# Patient Record
Sex: FEMALE | Race: WHITE | Employment: FULL TIME | URBAN - METROPOLITAN AREA
[De-identification: names, ages, dates, MRNs, and addresses within clinical notes are randomized per-mention and may not be internally consistent; named-entity substitution may affect disease eponyms.]

---
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
## 2019-12-10 ENCOUNTER — HOSPITAL ENCOUNTER (OUTPATIENT)
Age: 19
Setting detail: SPECIMEN
Discharge: HOME | End: 2019-12-10
Payer: COMMERCIAL

## 2019-12-10 ENCOUNTER — OFFICE VISIT (OUTPATIENT)
Dept: OBGYN CLINIC | Age: 19
End: 2019-12-10
Payer: COMMERCIAL

## 2019-12-10 VITALS
HEIGHT: 70 IN | WEIGHT: 147 LBS | BODY MASS INDEX: 21.05 KG/M2 | DIASTOLIC BLOOD PRESSURE: 80 MMHG | SYSTOLIC BLOOD PRESSURE: 112 MMHG

## 2019-12-10 DIAGNOSIS — Z01.419 ENCOUNTER FOR GYNECOLOGICAL EXAMINATION: Primary | ICD-10-CM

## 2019-12-10 PROCEDURE — 99385 PREV VISIT NEW AGE 18-39: CPT | Performed by: NURSE PRACTITIONER

## 2019-12-10 PROCEDURE — G8484 FLU IMMUNIZE NO ADMIN: HCPCS | Performed by: NURSE PRACTITIONER

## 2019-12-10 ASSESSMENT — ENCOUNTER SYMPTOMS
SHORTNESS OF BREATH: 0
ABDOMINAL DISTENTION: 0
BACK PAIN: 0
DIARRHEA: 0
ABDOMINAL PAIN: 0
CONSTIPATION: 0
COUGH: 0

## 2019-12-12 LAB — CYTOLOGY REPORT: NORMAL

## 2020-02-18 ENCOUNTER — TELEPHONE (OUTPATIENT)
Dept: OBGYN CLINIC | Age: 20
End: 2020-02-18

## 2020-02-18 NOTE — TELEPHONE ENCOUNTER
Patient noticed a lump under her left arm Sunday feb 9th over the past week though it seems to be getting smaller. States it seemed smaller than a milvia, she believes it might just be a lymph nod but wanted to schedule an appointment just to be sure. She has an appointment for 3-16-20. Will call if anything drastic changes.

## 2020-03-16 NOTE — PROGRESS NOTES
18 Y/O Pt calling back, received message that her appt had been cancelled and imaging has been ordered. Noticed lump in left breast on 02/09/20, no pain and no nipple discharge. Gave pt number to call and scheduled central scheduling for left breast US. Pt informed the office that her PGM and M. Aunt both had Br Ca. Pt states her other P. Aunts and P.Cousins all screened negative for [facility] gene. She is uncertain if the P. Aunt that had the Br Ca what her BRCA results where. Advised pt to call back when she finds out.

## 2020-03-18 ENCOUNTER — HOSPITAL ENCOUNTER (OUTPATIENT)
Dept: ULTRASOUND IMAGING | Age: 20
Discharge: HOME | End: 2020-03-20
Payer: COMMERCIAL

## 2020-03-18 PROCEDURE — 76642 ULTRASOUND BREAST LIMITED: CPT

## 2020-03-18 PROCEDURE — 19083 BX BREAST 1ST LESION US IMAG: CPT

## 2020-03-18 PROCEDURE — 2500000003 HC RX 250 WO HCPCS

## 2020-03-18 PROCEDURE — 88305 TISSUE EXAM BY PATHOLOGIST: CPT

## 2020-03-19 ENCOUNTER — TELEPHONE (OUTPATIENT)
Dept: OBGYN CLINIC | Age: 20
End: 2020-03-19

## 2020-03-19 LAB — SURGICAL PATHOLOGY REPORT: NORMAL

## 2020-03-19 NOTE — TELEPHONE ENCOUNTER
US results:  03/18/20 Left breast: Solid 1.4 cm mass at the patient indicated palpable lump in the left axilla. Ultrasound-guided biopsy is recommended.     [facility]     (orders have been placed for breat bx, plz call to )

## 2020-09-29 ENCOUNTER — TELEPHONE (OUTPATIENT)
Dept: OBGYN CLINIC | Age: 20
End: 2020-09-29

## 2020-09-29 PROBLEM — N63.0 BREAST LUMP IN FEMALE: Status: ACTIVE | Noted: 2020-02-09

## 2020-09-29 NOTE — TELEPHONE ENCOUNTER
22 y/o Gyn Pt [facility]. Just recently found out that her father is BRCA-1 gene carrier.

## 2020-10-26 ENCOUNTER — HOSPITAL ENCOUNTER (OUTPATIENT)
Dept: ULTRASOUND IMAGING | Age: 20
Discharge: HOME | End: 2020-10-28
Payer: COMMERCIAL

## 2020-10-26 PROCEDURE — 76642 ULTRASOUND BREAST LIMITED: CPT

## 2020-12-11 ENCOUNTER — OFFICE VISIT (OUTPATIENT)
Dept: OBGYN CLINIC | Age: 20
End: 2020-12-11
Payer: COMMERCIAL

## 2020-12-11 ENCOUNTER — HOSPITAL ENCOUNTER (OUTPATIENT)
Age: 20
Setting detail: SPECIMEN
Discharge: HOME | End: 2020-12-11
Payer: COMMERCIAL

## 2020-12-11 VITALS
BODY MASS INDEX: 21.7 KG/M2 | TEMPERATURE: 98.4 F | WEIGHT: 151.6 LBS | SYSTOLIC BLOOD PRESSURE: 102 MMHG | DIASTOLIC BLOOD PRESSURE: 72 MMHG | HEIGHT: 70 IN

## 2020-12-11 PROBLEM — F90.0 ATTENTION DEFICIT HYPERACTIVITY DISORDER, PREDOMINANTLY INATTENTIVE TYPE: Status: ACTIVE | Noted: 2020-12-11

## 2020-12-11 PROCEDURE — G8484 FLU IMMUNIZE NO ADMIN: HCPCS | Performed by: NURSE PRACTITIONER

## 2020-12-11 PROCEDURE — 99395 PREV VISIT EST AGE 18-39: CPT | Performed by: NURSE PRACTITIONER

## 2020-12-11 RX ORDER — NORGESTREL AND ETHINYL ESTRADIOL 0.3-0.03MG
1 KIT ORAL DAILY
Qty: 1 PACKET | Refills: 12 | Status: SHIPPED | OUTPATIENT
Start: 2020-12-11 | End: 2021-02-23

## 2020-12-11 ASSESSMENT — ENCOUNTER SYMPTOMS
CONSTIPATION: 0
ABDOMINAL DISTENTION: 0
ABDOMINAL PAIN: 0
DIARRHEA: 0
SHORTNESS OF BREATH: 0
COUGH: 0
BACK PAIN: 0

## 2020-12-11 NOTE — PROGRESS NOTES
HPI:     Arlyn Hughes a 23 y.o. female who presents today for:  Chief Complaint   Patient presents with    Annual Exam     last pap 12/10/19-WNL        HPI  Here for annual exam. 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 . Currently working at [employer]. No children. Her dad had BRCA testing done (his mom and sister had BrCa) and was positive. Her sister tested negative. She is having her testing done on 12/21. Eating healthy and exercises daily. GYNECOLOGICHISTORY:  MenstrualHistory: Patient's last menstrual period was 12/08/2020. Sexually Transmitted Infections: No  History of Ectopic Pregnancy:No  Menarche:12  Cycles q 28 Days  Durationof cycles: 3  Dysmenorrhea: minimal  Sexually active: yes  Dyspareunia: none    Current Outpatient Medications   Medication Sig Dispense Refill    norgestrel-ethinyl estradiol (ELINEST) 0.3-30 MG-MCG per tablet Take 1 tablet by mouth daily 1 packet 12     No current facility-administered medications for this visit. No Known Allergies    Past Medical History:   Diagnosis Date    Breast lump in female 02/09/2020    Lt Axilla    FH: BRCA1 gene positive 09/2020    father carrier     Denies family history of breast, ovarian, uterus, colon CA     Past Surgical History:   Procedure Laterality Date    BREAST BIOPSY Left 03/19/2020    Left Axilla biopsy:benign    WISDOM TOOTH EXTRACTION       Family History   Problem Relation Age of Onset    No Known Problems Mother     Other Father         BRCA-1 gene positive    No Known Problems Maternal Grandmother     No Known Problems Maternal Grandfather     Breast Cancer Paternal Grandmother 36    Lung Cancer Paternal Grandmother     No Known Problems Paternal Grandfather     Breast Cancer Paternal Aunt 30        three episodes Br Ca     Social History     Tobacco Use    Smoking status: Never Smoker    Smokeless tobacco: Never Used   Substance Use Topics    Alcohol use:  Yes Subjective:      Review of Systems   Constitutional: Negative for appetite change and fatigue. HENT: Negative for congestion and hearing loss. Eyes: Negative for visual disturbance. Respiratory: Negative for cough and shortness of breath. Cardiovascular: Negative for chest pain and palpitations. Gastrointestinal: Negative for abdominal distention, abdominal pain, constipation and diarrhea. Genitourinary: Negative for flank pain, frequency, menstrual problem, pelvic pain and vaginal discharge. Musculoskeletal: Negative for back pain. Neurological: Negative for syncope and headaches. Psychiatric/Behavioral: Negative for behavioral problems. Objective:     Temp 98.4 °F (36.9 °C)   Ht 5' 9.75\" (1.772 m)   Wt 151 lb 9.6 oz (68.8 kg)   LMP 12/08/2020   Breastfeeding No   BMI 21.91 kg/m²   Physical Exam  Constitutional:       Appearance: She is well-developed. HENT:      Head: Normocephalic. Eyes:      Extraocular Movements: Extraocular movements intact. Conjunctiva/sclera: Conjunctivae normal.   Neck:      Musculoskeletal: Normal range of motion. Thyroid: No thyromegaly. Trachea: No tracheal deviation. Pulmonary:      Effort: Pulmonary effort is normal. No respiratory distress. Chest:      Breasts: Breasts are symmetrical.         Right: No inverted nipple. Left: No inverted nipple, mass, nipple discharge, skin change or tenderness. Abdominal:      General: There is no distension. Palpations: Abdomen is soft. There is no mass. Tenderness: There is no abdominal tenderness. Genitourinary:     Labia:         Right: No rash or lesion. Left: No rash or lesion. Vagina: No vaginal discharge or tenderness. Cervix: No cervical motion tenderness, discharge or friability. Uterus: Not deviated, not enlarged and not fixed. Adnexa:         Right: No mass, tenderness or fullness.           Left: No mass, tenderness or fullness. Musculoskeletal: Normal range of motion. General: No tenderness. Skin:     General: Skin is warm and dry. Neurological:      General: No focal deficit present. Mental Status: She is alert and oriented to person, place, and time. Mental status is at baseline. Psychiatric:         Mood and Affect: Mood normal.         Behavior: Behavior normal.         Thought Content: Thought content normal.         Judgment: Judgment normal.           Assessment:     1. Encounter for gynecological examination          Plan:   1. Pap collected. Discussed new pap smear guidelines. Desires re-pap in 1 year. 2. Breast self exam reviewed  3. Calcium and Vitamin D dosing reviewed. 4. Seat belt use reviewed  5. Family planning reviewed.   Birth control ocp  Gardasil status   FU US left breast early January  Electronicallysigned by Dylan Bland on 12/11/2020

## 2020-12-22 LAB — CYTOLOGY REPORT: NORMAL

## 2021-01-27 ENCOUNTER — HOSPITAL ENCOUNTER (OUTPATIENT)
Dept: ULTRASOUND IMAGING | Age: 21
Discharge: HOME | End: 2021-01-29
Payer: COMMERCIAL

## 2021-01-27 DIAGNOSIS — Z80.3 FH: BREAST CANCER: ICD-10-CM

## 2021-01-27 DIAGNOSIS — N63.20 MASS OF LEFT BREAST: ICD-10-CM

## 2021-01-27 DIAGNOSIS — Z98.890 HISTORY OF BENIGN BREAST BIOPSY: ICD-10-CM

## 2021-01-27 PROCEDURE — 76642 ULTRASOUND BREAST LIMITED: CPT

## 2021-02-10 DIAGNOSIS — N63.32 MASS OF AXILLARY TAIL OF LEFT BREAST: Primary | ICD-10-CM

## 2021-02-10 DIAGNOSIS — Z80.3 FH: BREAST CANCER: ICD-10-CM

## 2021-02-10 DIAGNOSIS — Z98.890 HISTORY OF BENIGN BREAST BIOPSY: ICD-10-CM

## 2021-05-18 RX ORDER — NORGESTREL AND ETHINYL ESTRADIOL 0.3-0.03MG
KIT ORAL
Qty: 84 TABLET | Refills: 3 | Status: SHIPPED | OUTPATIENT
Start: 2021-05-18 | End: 2021-05-24

## 2021-05-24 RX ORDER — NORGESTREL AND ETHINYL ESTRADIOL 0.3-0.03MG
KIT ORAL
Qty: 84 TABLET | Refills: 3 | Status: SHIPPED | OUTPATIENT
Start: 2021-05-24 | End: 2021-12-13 | Stop reason: SDUPTHER

## 2021-12-10 ASSESSMENT — ENCOUNTER SYMPTOMS
ABDOMINAL DISTENTION: 0
COUGH: 0
SHORTNESS OF BREATH: 0
BACK PAIN: 0
CONSTIPATION: 0
ABDOMINAL PAIN: 0
DIARRHEA: 0

## 2021-12-10 NOTE — PROGRESS NOTES
[facility]  [facility] [place] [address]  [address]  Dept: [phone]    DATE OF VISIT:  12/10/21        History and Physical    Trang Hargrove    :  2000  CHIEF COMPLAINT:  No chief complaint on file. HPI :   Trang Hargrove is a 19 y.o. female here for her annual exam.     Found out she is positive BRCA-1 positive this year. Her PGM  of OvCa almost 50 years ago. Paternal aunt dx OvCa within last year and she also has BrCa. Works as a .  No children. Eating healthy, staying very active. Menarche at 13. She is sexually active. She uses OCP for contraception and is not using condoms for STI protection. Periods are monthly, occurring every 28-30 days and lasting 3 days. She denies HMB and has minimal dysmenorrhea. The patient denies any family member or herself as having a DVT or blood clotting disorder, cardiac disease (including HTN), risk for CVA disease/stroke and no hx of migraine with aura. Non-smoker if 28 or older.   Aware of need to notify office with and changes in health that includes any of these dx.    _____________________________________________________________________  Past Medical History:   Diagnosis Date    Breast lump in female 2020    Lt Axilla    FH: BRCA1 gene positive 2020    father carrier                                                                   Past Surgical History:   Procedure Laterality Date    BREAST BIOPSY Left 2020    Left Axilla biopsy:benign    WISDOM TOOTH EXTRACTION       Family History   Problem Relation Age of Onset    No Known Problems Mother     Other Father         BRCA-1 gene positive    No Known Problems Maternal Grandmother     No Known Problems Maternal Grandfather     Breast Cancer Paternal Grandmother 36    Lung Cancer Paternal Grandmother     No Known Problems Paternal Grandfather     Breast Cancer Paternal Aunt 34        three episodes Br Ca     Social History     Tobacco Use   Smoking Status Never Smoker   Smokeless Tobacco Never Used     Social History     Substance and Sexual Activity   Alcohol Use Yes     Current Outpatient Medications   Medication Sig Dispense Refill    LOW-OGESTREL 0.3-30 MG-MCG per tablet TAKE AS DIRECTED BY PRESCRIBER OR PACKAGE INSTRUCTIONS (APPOINTMENT 20) 84 tablet 3     No current facility-administered medications for this visit. Allergies:  No Known Allergies    Gynecologic History:  No LMP recorded. Sexually Active: Yes  How many partners in the last 12 months- 1  Partners: monogamous BF  Dyspareunia: denies  STD History:No  Birth Control: Yes OCP  Pap History: 20  Gardasil: complete  Family hx Breast, Ovarian, Colorectal Cancer: Pt BRCA-1 positive       OB History    Para Term  AB Living   0 0 0 0 0 0   SAB IAB Ectopic Molar Multiple Live Births   0 0 0 0 0 0       Review of Systems   Constitutional: Negative for appetite change and fatigue. HENT: Negative for congestion and hearing loss. Eyes: Negative for visual disturbance. Respiratory: Negative for cough and shortness of breath. Cardiovascular: Negative for chest pain and palpitations. Gastrointestinal: Negative for abdominal distention, abdominal pain, constipation and diarrhea. Genitourinary: Negative for flank pain, frequency, menstrual problem, pelvic pain and vaginal discharge. Musculoskeletal: Negative for back pain. Neurological: Negative for syncope and headaches. Psychiatric/Behavioral: Negative for behavioral problems. There were no vitals taken for this visit. Physical Exam  Constitutional:       Appearance: She is well-developed. HENT:      Head: Normocephalic. Eyes:      Extraocular Movements: Extraocular movements intact. Conjunctiva/sclera: Conjunctivae normal.   Neck:      Thyroid: No thyromegaly. Trachea: No tracheal deviation.    Pulmonary:      Effort: Pulmonary effort is normal. No respiratory distress. Chest:   Breasts: Breasts are symmetrical.      Right: No inverted nipple. Left: No inverted nipple, mass, nipple discharge, skin change or tenderness. Abdominal:      General: There is no distension. Palpations: Abdomen is soft. There is no mass. Tenderness: There is no abdominal tenderness. Genitourinary:     Labia:         Right: No rash or lesion. Left: No rash or lesion. Vagina: No vaginal discharge or tenderness. Cervix: No cervical motion tenderness, discharge or friability. Uterus: Not deviated, not enlarged and not fixed. Adnexa:         Right: No mass, tenderness or fullness. Left: No mass, tenderness or fullness. Musculoskeletal:         General: No tenderness. Normal range of motion. Cervical back: Normal range of motion. Skin:     General: Skin is warm and dry. Neurological:      General: No focal deficit present. Mental Status: She is alert and oriented to person, place, and time. Mental status is at baseline. Psychiatric:         Mood and Affect: Mood normal.         Behavior: Behavior normal.         Thought Content: Thought content normal.         Judgment: Judgment normal.             ASSESSMENT:     19 y.o. Female; Annual   Diagnosis Orders   1. FH: breast cancer       No follow-ups on file. PLAN:  - Pap collected per ASCCP Guidelines. - Birth control discussed. - Smoking risk factors discussed  - Diet and exercise reviewed. - Routine health maintenance per patient's PCP.     Electronically signed by EROS Mcdonald CNP on 12/10/21 at 12:27 PM EST  [address] [facility]

## 2021-12-13 ENCOUNTER — OFFICE VISIT (OUTPATIENT)
Dept: OBGYN CLINIC | Age: 21
End: 2021-12-13
Payer: COMMERCIAL

## 2021-12-13 ENCOUNTER — HOSPITAL ENCOUNTER (OUTPATIENT)
Age: 21
Setting detail: SPECIMEN
Discharge: HOME | End: 2021-12-13

## 2021-12-13 VITALS
DIASTOLIC BLOOD PRESSURE: 84 MMHG | BODY MASS INDEX: 21.73 KG/M2 | WEIGHT: 151.8 LBS | SYSTOLIC BLOOD PRESSURE: 122 MMHG | HEIGHT: 70 IN

## 2021-12-13 DIAGNOSIS — Z15.02 BRCA1 GENE MUTATION POSITIVE IN FEMALE: Primary | ICD-10-CM

## 2021-12-13 DIAGNOSIS — Z15.09 BRCA1 GENE MUTATION POSITIVE IN FEMALE: Primary | ICD-10-CM

## 2021-12-13 DIAGNOSIS — Z30.41 ORAL CONTRACEPTIVE PILL SURVEILLANCE: ICD-10-CM

## 2021-12-13 DIAGNOSIS — Z01.419 ENCOUNTER FOR GYNECOLOGICAL EXAMINATION: ICD-10-CM

## 2021-12-13 DIAGNOSIS — Z80.3 FH: BREAST CANCER: ICD-10-CM

## 2021-12-13 DIAGNOSIS — Z15.01 BRCA1 GENE MUTATION POSITIVE IN FEMALE: Primary | ICD-10-CM

## 2021-12-13 PROCEDURE — G8484 FLU IMMUNIZE NO ADMIN: HCPCS | Performed by: NURSE PRACTITIONER

## 2021-12-13 PROCEDURE — 99395 PREV VISIT EST AGE 18-39: CPT | Performed by: NURSE PRACTITIONER

## 2021-12-13 RX ORDER — NORGESTREL AND ETHINYL ESTRADIOL 0.3-0.03MG
KIT ORAL
Qty: 84 TABLET | Refills: 4 | Status: SHIPPED | OUTPATIENT
Start: 2021-12-13 | End: 2022-08-04 | Stop reason: SDUPTHER

## 2021-12-13 RX ORDER — CLINDAMYCIN PHOSPHATE AND BENZOYL PEROXIDE 10; 50 MG/G; MG/G
GEL TOPICAL
COMMUNITY
Start: 2021-12-01

## 2021-12-20 LAB — CYTOLOGY REPORT: NORMAL

## 2021-12-22 ENCOUNTER — TELEPHONE (OUTPATIENT)
Dept: GYNECOLOGIC ONCOLOGY | Age: 21
End: 2021-12-22

## 2022-01-07 ENCOUNTER — OFFICE VISIT (OUTPATIENT)
Dept: GYNECOLOGIC ONCOLOGY | Age: 22
End: 2022-01-07
Payer: COMMERCIAL

## 2022-01-07 VITALS
TEMPERATURE: 98.1 F | BODY MASS INDEX: 21.99 KG/M2 | HEART RATE: 57 BPM | HEIGHT: 70 IN | SYSTOLIC BLOOD PRESSURE: 113 MMHG | DIASTOLIC BLOOD PRESSURE: 73 MMHG | OXYGEN SATURATION: 100 % | WEIGHT: 153.6 LBS

## 2022-01-07 DIAGNOSIS — Z15.09 BRCA GENE POSITIVE: Primary | ICD-10-CM

## 2022-01-07 DIAGNOSIS — Z15.01 BRCA GENE POSITIVE: Primary | ICD-10-CM

## 2022-01-07 PROCEDURE — G8420 CALC BMI NORM PARAMETERS: HCPCS | Performed by: OBSTETRICS & GYNECOLOGY

## 2022-01-07 PROCEDURE — 1036F TOBACCO NON-USER: CPT | Performed by: OBSTETRICS & GYNECOLOGY

## 2022-01-07 PROCEDURE — 99203 OFFICE O/P NEW LOW 30 MIN: CPT | Performed by: OBSTETRICS & GYNECOLOGY

## 2022-01-07 PROCEDURE — G8427 DOCREV CUR MEDS BY ELIG CLIN: HCPCS | Performed by: OBSTETRICS & GYNECOLOGY

## 2022-01-07 PROCEDURE — G8484 FLU IMMUNIZE NO ADMIN: HCPCS | Performed by: OBSTETRICS & GYNECOLOGY

## 2022-01-07 ASSESSMENT — ENCOUNTER SYMPTOMS
ABDOMINAL PAIN: 1
EYES NEGATIVE: 1
RESPIRATORY NEGATIVE: 1

## 2022-01-07 NOTE — PROGRESS NOTES
Review of Systems   Constitutional: Negative. HENT:  Negative. Eyes: Negative. Respiratory: Negative. Cardiovascular: Negative. Gastrointestinal: Positive for abdominal pain. Endocrine: Negative. Genitourinary: Negative. Musculoskeletal: Negative. Skin: Negative. Neurological: Negative. Hematological: Negative.

## 2022-01-07 NOTE — PROGRESS NOTES
[address] [facility], [facility] [address]  [place] [address]      I am seeing Jeovany Aura for New Patient at the request of IDA Taveras. Her PCP is Silvino Beach. Chief Complaint: BRCA1 positivity    HPI  She is a 19 y.o. [de-identified] female who is being referred for discovered BRCA1 positivity. The patient states that her paternal grandmother  of ovarian cancer about 50 years ago. Her paternal aunt was diagnosed with ovarian cancer this last year and also had breast cancer. Her father therefore was tested and was found to be BRCA1 positive. She and her sister saw Amita Sandoval, genetic counselor at the [address] [place] [address] Pkwy was recommended. Her sister was negative however Christiana Hagen was BRCA1 positive. This was discovered in 2020. The patient then saw Dominga Alves for a well woman examination. Ultrasound of the breast was performed on 3/18/2020. She was noted to have a 1.4 cm solid mass in the tail of the left breast.  Ultrasound-guided biopsy was recommended and was performed and was \"benign\". The patient had a follow-up breast ultrasound on 10/26/2020 that revealed the same lesion that was \"minimally larger\". A short interval ultrasound follow-up was recommended in 3 to 4 months. This was performed on 2021. The \"solid hypoechoic ovoid well-demarcated mass measured 1.36 x 1.5 x 0.  8 cm in was \"minimally larger\" but \"similar in appearance\" and \"no significant interval change\". BI-RADS Category 2    An additional follow-up ultrasound was recommended in 12 months. Months. IDA Taveras has also referred her to [facility] gynecologic oncology for further counseling. The patient states that she began menarche at age 13. She is on birth control pills for contraception. Her menses are regular every 28 to 30 days and last about 3 days. She has no spotting in between menses. She denies dysmenorrhea.     Patient has not had pelvic ultrasound performed. Patient she has not had  antigen. She has not seen a breast specialist as yet. Review of Systems  I have personally reviewed and agree with the review of systems done by my ancillary staff in the [facility] documentation. OBJECTIVE:  Vitals:    01/07/22 0858   BP: 113/73   Site: Right Upper Arm   Position: Sitting   Cuff Size: Medium Adult   Pulse: 57   Temp: 98.1 °F (36.7 °C)   SpO2: 100%   Weight: 153 lb 9.6 oz (69.7 kg)   Height: 5' 10\" (1.778 m)       General: Alert and oriented x3, no acute distress    Further examination was not performed but rather a discussion was held regarding her situation and management options. Family History   Problem Relation Age of Onset    No Known Problems Mother     Other Father         BRCA-1 gene positive    No Known Problems Maternal Grandmother     No Known Problems Maternal Grandfather     Breast Cancer Paternal Grandmother 36    Lung Cancer Paternal Grandmother     No Known Problems Paternal Grandfather     Breast Cancer Paternal Aunt 30        three episodes Br Ca       Assessment:  1. BRCA gene positive        Discussion: The discussion was held with the patient concerning management options. The patient was given [facility] high risk breast program number at [phone] and was advised to call regarding additional information for management. I mention that the guidelines state that usually at age 22 clinical breast examinations every 6 months are performed along with an annual breast MRI. I mention that it age 25 and annual mammogram is performed and alternated with breast MRI every 6 months. At age 26 the patient may consider having breast reduction surgery. As far as ovarian cancer prevention. I mention that the birth control pill is a reduction measure. Ordinarily by age 20 I would obtain a baseline pelvic and transvaginal ultrasound to evaluate the ovaries and a Ca1 25.     Again after age 28 or when her family is complete she should consider prophylactic bilateral salpingo-oophorectomy to reduce her chances of ovarian and/or fallopian tube cancer . All of her questions were answered to her satisfaction and she is agreed to proceed as planned. Plan:  1. Obtain baseline mammogram    2. Obtain baseline breast MRI in 6 months prior to her next visit    3. At her next visit we may wish to consider referral to a breast specialist for future monitoring, etc..    4.  [facility] consultation now. 5.  We will obtain pelvic and transvaginal ultrasound now and again before next visit    6. We will obtain Ca1 25 antigen now and again before next visit. 7.  We will have her return in 6 months for follow-up and to review results. Follow Up: 6 months            I spent 30 minutes providing care to the patient and >50% of the time was spent counseling and conoordinating care, discussing the patient's current situation, reviewing her options, counseling and education her and answering her questions. All of the patient's pertinent images, labs and previous records and procedures were reviewed.     Electronically signed by Jem Dominguez MD on 1/7/22 at 9:27 AM EST

## 2022-01-28 ENCOUNTER — HOSPITAL ENCOUNTER (OUTPATIENT)
Dept: ULTRASOUND IMAGING | Age: 22
Discharge: HOME | End: 2022-01-30
Payer: COMMERCIAL

## 2022-01-28 DIAGNOSIS — Z15.01 BRCA GENE POSITIVE: ICD-10-CM

## 2022-01-28 DIAGNOSIS — Z15.09 BRCA GENE POSITIVE: ICD-10-CM

## 2022-01-28 PROCEDURE — 76856 US EXAM PELVIC COMPLETE: CPT

## 2022-01-28 PROCEDURE — 76830 TRANSVAGINAL US NON-OB: CPT

## 2022-03-01 DIAGNOSIS — Z12.31 VISIT FOR SCREENING MAMMOGRAM: Primary | ICD-10-CM

## 2022-04-04 ENCOUNTER — HOSPITAL ENCOUNTER (OUTPATIENT)
Age: 22
Discharge: HOME | End: 2022-04-04
Payer: COMMERCIAL

## 2022-04-04 ENCOUNTER — APPOINTMENT (OUTPATIENT)
Dept: MRI IMAGING | Age: 22
End: 2022-04-04
Payer: COMMERCIAL

## 2022-04-04 ENCOUNTER — HOSPITAL ENCOUNTER (OUTPATIENT)
Dept: MAMMOGRAPHY | Age: 22
Discharge: HOME | End: 2022-04-06
Payer: COMMERCIAL

## 2022-04-04 DIAGNOSIS — Z15.09 BRCA GENE POSITIVE: ICD-10-CM

## 2022-04-04 DIAGNOSIS — Z12.31 VISIT FOR SCREENING MAMMOGRAM: ICD-10-CM

## 2022-04-04 DIAGNOSIS — Z15.01 BRCA GENE POSITIVE: ICD-10-CM

## 2022-04-04 LAB — CA 125: 19 U/ML

## 2022-04-04 PROCEDURE — 86304 IMMUNOASSAY TUMOR CA 125: CPT

## 2022-04-04 PROCEDURE — 77063 BREAST TOMOSYNTHESIS BI: CPT

## 2022-04-04 PROCEDURE — 36415 COLL VENOUS BLD VENIPUNCTURE: CPT

## 2022-04-04 RX ORDER — NORGESTREL AND ETHINYL ESTRADIOL 0.3-0.03MG
KIT ORAL
Qty: 84 TABLET | Refills: 3 | OUTPATIENT
Start: 2022-04-04

## 2022-04-05 DIAGNOSIS — Z15.01 BRCA GENE POSITIVE: Primary | ICD-10-CM

## 2022-04-05 DIAGNOSIS — Z15.09 BRCA GENE POSITIVE: Primary | ICD-10-CM

## 2022-06-30 ENCOUNTER — HOSPITAL ENCOUNTER (OUTPATIENT)
Dept: ULTRASOUND IMAGING | Age: 22
Discharge: HOME | End: 2022-07-02
Payer: COMMERCIAL

## 2022-06-30 ENCOUNTER — HOSPITAL ENCOUNTER (OUTPATIENT)
Age: 22
Discharge: HOME | End: 2022-06-30
Payer: COMMERCIAL

## 2022-06-30 DIAGNOSIS — Z15.09 BRCA GENE POSITIVE: ICD-10-CM

## 2022-06-30 DIAGNOSIS — Z15.01 BRCA GENE POSITIVE: ICD-10-CM

## 2022-06-30 LAB — CA 125: 20 U/ML

## 2022-06-30 PROCEDURE — 36415 COLL VENOUS BLD VENIPUNCTURE: CPT

## 2022-06-30 PROCEDURE — 76830 TRANSVAGINAL US NON-OB: CPT

## 2022-06-30 PROCEDURE — 86304 IMMUNOASSAY TUMOR CA 125: CPT

## 2022-07-08 ENCOUNTER — OFFICE VISIT (OUTPATIENT)
Dept: GYNECOLOGIC ONCOLOGY | Age: 22
End: 2022-07-08
Payer: COMMERCIAL

## 2022-07-08 VITALS
OXYGEN SATURATION: 100 % | DIASTOLIC BLOOD PRESSURE: 81 MMHG | WEIGHT: 152.6 LBS | HEIGHT: 70 IN | HEART RATE: 68 BPM | SYSTOLIC BLOOD PRESSURE: 124 MMHG | BODY MASS INDEX: 21.85 KG/M2

## 2022-07-08 DIAGNOSIS — N94.6 DYSMENORRHEA: ICD-10-CM

## 2022-07-08 DIAGNOSIS — Z15.09 BRCA GENE POSITIVE: Primary | ICD-10-CM

## 2022-07-08 DIAGNOSIS — Z15.01 BRCA GENE POSITIVE: Primary | ICD-10-CM

## 2022-07-08 DIAGNOSIS — Z87.42 HISTORY OF ABNORMAL UTERINE BLEEDING: ICD-10-CM

## 2022-07-08 PROCEDURE — 99417 PROLNG OP E/M EACH 15 MIN: CPT | Performed by: OBSTETRICS & GYNECOLOGY

## 2022-07-08 PROCEDURE — 99215 OFFICE O/P EST HI 40 MIN: CPT | Performed by: OBSTETRICS & GYNECOLOGY

## 2022-07-08 PROCEDURE — G8420 CALC BMI NORM PARAMETERS: HCPCS | Performed by: OBSTETRICS & GYNECOLOGY

## 2022-07-08 PROCEDURE — G8427 DOCREV CUR MEDS BY ELIG CLIN: HCPCS | Performed by: OBSTETRICS & GYNECOLOGY

## 2022-07-08 PROCEDURE — 1036F TOBACCO NON-USER: CPT | Performed by: OBSTETRICS & GYNECOLOGY

## 2022-07-08 ASSESSMENT — ENCOUNTER SYMPTOMS
RESPIRATORY NEGATIVE: 1
EYES NEGATIVE: 1
GASTROINTESTINAL NEGATIVE: 1

## 2022-07-08 NOTE — PROGRESS NOTES
chills. TREATMENT SUMMARY:  THESE RECORDS HAVE BEEN REVIEWED, UNLESS OTHERWISE INDICATED     1. Deleterious BRCA1 gene mutation (Diagnosed 2021)  The patient reports to have experienced symptoms of heavy menstrual cycles and dysmenorrhea (since age 11), treated with combined OCPs    DIAGNOSIS ESTABLISHED:   c.676del ([id number]) pathogenic variant identified in BRCA1, based on an [facility] sequence analysis and deletion / duplication testing (single site testing) (01/04/21)    OVARIAN CANCER SCREENING  DIAGNOSTIC STUDIES:  IMAGING EVALUATION:  Pelvic Ultrasound (01/28/22). Transabdominal and Transvaginal views   6.7x3. 2x3.2cm uterus, with a 0.5cm endometrial stripe  2.6x3.0x1.1cm RIGHT ovary  1.5x2.0x1.9cm LEFT ovary  Trace simple appearing intraperitoneal free fluid  TVUS (06/30/22). COMPARISON: 01/28/22  6.3x3. 2x3.4cm uterus, with 0.46cm endometrial stripe  2.2x1.6x2.5cm RIGHT ovary   2.0x1.7x2.2cm LEFT ovary  Minimal free fluid (likely physiologic)  LAB EVALUATION:   = 19 (04/04/22) => 20 (06/30/22)    BREAST CANCER SCREENING  DIAGNOSTIC STUDIES:  IMAGING EVALUATION:  Breast US (03/18/20): BR4A. Breast biopsy was recommended. LEFT breast US demonstrates a 1.4cm solid mass in the left axilla. RIGHT breast with no sonographic abnormalities. DIAGNOSIS ESTABLISHED:  LEFT axillary biopsy (03/18/20): Final pathology is consistent with benign fibroglandular breast tissue without histologic abnormality    DIAGNOSTIC STUDIES:  IMAGING EVALUATION:  Breast US (10/26/20): BR3, recommend follow up ultrasound in 3-4months. Previously biopsied lesion measures minimally larger than prior study, 1.36x1.5x0.8cm. Breast US (01/27/21): BR2   Mammogram (04/04/22): BR1  The patient was referred to breast specialty clinic by Dr. Beka Benito in January 2022.  The patient endorses that she did not follow up with breast specialist. Breast MRI was ordered but patient reports that her insurance would not cover.    HEALTH MAINTENANCE:     She denies to have any history of cervix dysplasia. Last pap test normal. (21, a copy of the report has been reviewed . Next pap test due   See TREATMENT SUMMARY for mammogram screening  MRI ordered- patient could not complete due to insurance coverage  Vaccines: Tdap (unable to recall). Influenza (Not UTD). Gardasil (UTD). COVID-19 (Not UTD). Despite a discussion held regarding the personal and community benefits, with minimal risks, the patient declines to accept vaccination. PAST MEDICAL HISTORY:     Acne: Treated with Clindamycin    PAST SURGICAL HISTORY:     LEFT axillary biopsy (2020): Benign pathology per patient report    PAST OBSTETRIC HISTORY:       G0, P0    PAST GYNECOLOGIC HISTORY:     Menarche age 13 / regular / 3 days. She reports to have a 9 year history of OCP use. She endorses being sexually active with one male partner. She denies to have dyspareunia or post-coital bleeding. She denies to have any history of sexually transmitted infections. PAST SOCIAL HISTORY:     The patient has completed a bachelor's degree. She works at [employer] and [employer]). She lives with her boyfriend of 8 years Go Monahan). She reports to be independent and fully functional in her activities of daily living. The patient endorses occasional alcohol use and reports to consume 2-3 servings of beer or wine or liquor per week. She denies to have any history of tobacco or drug abuse. PAST FAMILY HISTORY:     Father: BRCA1 gene positive    PGM: Breast cancer. Diagnosed, age 36 (, age unknown)   PAunt: Breast cancer. Diagnosed, age 27. Ovarian cancer. Diagnosed, age 77 ([de-identified], age 74))    The patient denies to have any family history of melanoma, brain, bladder, uterine, colon, prostate, pancreas or stomach cancer.     MEDICATIONS: MEDICATIONS HAVE BEEN RECONCILED     Clindamycin-Benzoyl Per, Refr, 1.2-5 % GEL norgestrel-ethinyl estradiol (LOW-OGESTREL) 0.3-30 MG-MCG per tablet 1 tablet PO daily     ALLERGIES: ALLERGIES HAVE BEEN RECONCILED     DOXYCYCLINE causes rash and hives     REVIEW OF SYSTEMS:     A complete 12 point review of systems was performed and is negative except as noted  SKIN: She describes to have symptoms of a rash (armpits). I have personally reviewed and agree with the review of systems performed by the ancillary staff in the [facility] documentation. PHYSICAL EXAM:       VITALS: /81  Pulse 68   Wt 152lb 9.6oz (69.2 kg)   SpO2 100%    GENERAL: Well hydrated and well nourished. She is alert, cooperative and in no acute distress. She is wearing a mask in accordance with CDC COVID-19 guidelines. SKIN:  Skin texture, turgor and pigmentation appear normal. There are no rashes, cyanosis or petechiae noted   HEENT:  Atraumatic, normocephalic. No conjunctival injection, ptosis or scleral icterus noted. Thyroid is not enlarged   CV:  Regular rate and rhythm without murmurs, rubs, clicks or gallops   LUNGS:  Clear to auscultation without wheezes, rales or rhonchi   ABDOMEN:  Soft, non-tender, non-distended. No masses, organomegaly, rebound tenderness or guarding noted. Active bowel sounds. There are no pulsations, bruits or flank pain noted. EXT:  No clubbing, cyanosis, edema or varicosities noted. Pulses are equal and adequate   BREASTS:  Normal symmetry. No discrete masses, nipple discharge, dimpling, introversion or tenderness noted. Rash noted in bilateral axilla   PELVIC:  Vulvar exam demonstrates no gross lesions. Speculum exam demonstrates no gross vaginal or cervix lesions. There is no discharge, inflammation or evidence of infection noted. On bimanual exam, uterus palpates to be a normal size and shape. There is no tenderness with uterine or cervix manipulation.  There is no adnexal tenderness or obvious masses palpated  NEUROLOGIC:  The patient is functionally intact   LYMPH NODES:  There is no cervical or supraclavicular, axillary and inguinal lymphadenopathy noted     MEDICAL RECORDS REVIEW:     All pertinent attached records and previous notes were reviewed       The following visit notes have been reviewed:   Visit note, Dr. Ankur López (01/07/22) [Gynecologic Oncology]  She and her sister saw Leopold Kindle, genetic counselor at the [address] [place], [place] [address]wy was recommended. Her sister was negative however Tata Current was BRCA1 positive  The patient was given [facility]'s high risk breast program number at [phone] and was advised to call regarding additional information for management  Refer her to [facility] high risk breast program for consultation  Obtain pelvic and transvaginal ultrasound now and again before next visit  Obtain  antigen now and again before next visit    Visit note, Rachel Murcia, NP (12/10/19 to 12/13/21) [OB/GYN]  Referral sent to Gynecologic Oncology     Visit note, Kasey Beltre, [address] (01/04/21 to 01/12/21) [Genetic Counselor at [address] [facility] has decided to be tested for BRCA1 c.676del mutation previously found in her father. Ms. Ilan Serrato is choosing to undergo BRCA single site gene testing through [facility]. The following pathology reports were reviewed:  Pap test (12/10/19, 12/11/20 and 12/13/21)  Negative for intraepithelial lesion or malignancy. Endocervix component present. ASSESSMENT:     15yo with LMP 06/21/22, with symptoms of heavy menstrual cycles and dysmenorrhea (since age 11), treated with combined OCPs. She carries a known diagnosis of a BRCA1 gene mutation, based on [facility] single site testing (01/04/21). She presents today to determine further management    PLAN:     As this was my 1st visit with Layman Jonathon, I have extensively reviewed the medical records. A discussion was held regarding Sherry Hamilton's diagnosis and plan of care. She verbalized a good understanding.     1. Deleterious BRCA1 gene mutation (Diagnosed 2021)  The patient has been counseled regarding her diagnosis. She reports to have a copy of the results. Based on extensive she has been noted to have   met with Brigid Riedel, [facility] on 01/12/21   The patient has been counseled by myself today and previously by Dr. Michael Chambers regarding the associated cancer risks   She remains well informed regarding the associated cancer risks. We have discussed the associated gynecologic malignancy risk in detail and options of management were reviewed. She has been informed that at the age of 23, it would be perfectly reasonable to complete her desires for fertility. Recommendations have been reviewed for the patient to complete annual screening exams, with a pelvic exam, pelvic ultrasound and  level until she is ready to proceed with the risk reducing BSO. The baseline normal pelvic ultrasound and  level results have been reviewed with the patient today. The patient has been asked to schedule repeat testing prior to the next visit in 1 year  The patient has been informed regarding the limitations of ovarian cancer screening. She has been informed that there is an absence of high quality data supporting ovarian cancer screening in the general population, however, completing a pelvic ultrasound and  level every year until age 27, then every 6 months until she is ready to proceed with the risk reducing BSO, has become a reasonable option in women with hereditary cancer syndrome   The patient has been on combined low dose OCPs since age 11 to treat heavy menstrual cycles and dysmenorrhea. She has been advised to continue with this option of chemoprevention. An escription has been sent to the [address] [place] [address] listed in EPIC. She desires to have this sent to [facility] for 3 month supplies. A message has been sent to staff to have [facility] added to profile so this prescription can be sent in a way the patient desires.   The patient expresses desire to maintain observation status for breast cancer screening as well. She has been advised to continue breast cancer screening with the high risk breast program. A referral has been established today. DISPOSITION:     The patient verbalized understanding of our extensive discussion today and of follow up instructions. All questions were answered to her apparent satisfaction. Therefore, she agreed to proceed as planned. Return in 1 year for annual exam, with pelvic ultrasound and  level to be completed prior to the visit. TIME SUMMARY:  Total time spent: 360 minutes. In total, 300 minutes included records review and chart completion outside of the date service (DATES: 07/20/22 and 08/04/22)  In total, 60 minutes included chart completion, interpretation of findings, patient counseling / teaching, coordination of care and face to face discussion with the patient today (F2F START TIME: 0830. F2F STOP TIME: 0930).       Partha Espinal MD  Gynecologic Oncology

## 2022-07-20 PROCEDURE — 99358 PROLONG SERVICE W/O CONTACT: CPT | Performed by: OBSTETRICS & GYNECOLOGY

## 2022-07-20 PROCEDURE — 99359 PROLONG SERV W/O CONTACT ADD: CPT | Performed by: OBSTETRICS & GYNECOLOGY

## 2022-08-04 DIAGNOSIS — Z15.01 BRCA GENE POSITIVE: ICD-10-CM

## 2022-08-04 DIAGNOSIS — Z15.09 BRCA GENE POSITIVE: ICD-10-CM

## 2022-08-04 RX ORDER — NORGESTREL AND ETHINYL ESTRADIOL 0.3-0.03MG
KIT ORAL
Qty: 84 TABLET | Refills: 4 | Status: SHIPPED | OUTPATIENT
Start: 2022-08-04 | End: 2022-08-04

## 2022-08-04 RX ORDER — NORGESTREL AND ETHINYL ESTRADIOL 0.3-0.03MG
KIT ORAL
Qty: 84 TABLET | Refills: 4 | Status: SHIPPED | OUTPATIENT
Start: 2022-08-04

## 2023-01-10 ENCOUNTER — OFFICE VISIT (OUTPATIENT)
Dept: GYNECOLOGIC ONCOLOGY | Age: 23
End: 2023-01-10
Payer: COMMERCIAL

## 2023-01-10 VITALS
HEART RATE: 76 BPM | SYSTOLIC BLOOD PRESSURE: 118 MMHG | BODY MASS INDEX: 21.19 KG/M2 | OXYGEN SATURATION: 100 % | HEIGHT: 70 IN | DIASTOLIC BLOOD PRESSURE: 74 MMHG | WEIGHT: 148 LBS

## 2023-01-10 DIAGNOSIS — Z15.09 BRCA GENE POSITIVE: Primary | ICD-10-CM

## 2023-01-10 DIAGNOSIS — N64.4 BREAST TENDERNESS: ICD-10-CM

## 2023-01-10 DIAGNOSIS — Z15.01 BRCA GENE POSITIVE: Primary | ICD-10-CM

## 2023-01-10 PROCEDURE — G8484 FLU IMMUNIZE NO ADMIN: HCPCS | Performed by: PHYSICIAN ASSISTANT

## 2023-01-10 PROCEDURE — 1036F TOBACCO NON-USER: CPT | Performed by: PHYSICIAN ASSISTANT

## 2023-01-10 PROCEDURE — 99213 OFFICE O/P EST LOW 20 MIN: CPT | Performed by: PHYSICIAN ASSISTANT

## 2023-01-10 PROCEDURE — G8420 CALC BMI NORM PARAMETERS: HCPCS | Performed by: PHYSICIAN ASSISTANT

## 2023-01-10 PROCEDURE — G8427 DOCREV CUR MEDS BY ELIG CLIN: HCPCS | Performed by: PHYSICIAN ASSISTANT

## 2023-01-10 ASSESSMENT — ENCOUNTER SYMPTOMS
GASTROINTESTINAL NEGATIVE: 1
EYES NEGATIVE: 1
RESPIRATORY NEGATIVE: 1

## 2023-01-10 NOTE — PROGRESS NOTES
[address] [facility], [facility], [address] [address]    Vinayak Mancini is a 23 y.o. female who presents for a follow up for breast concern. Chief Complaint:  R axillary tenderness     HPI: Patient is a G[de-identified]77-year-old female who is known to [address] [place] [address] gynecologic oncology for known heterozygous pathogenic BRCA1 genetic mutation diagnosed by [facility] January 4, 2021. She is currently on active surveillance for high risk HBOC given BRCA1 mutation. She is planned to have a pelvic ultrasound and  in July 2023. Most recent breast imaging about was April 4, 2022 with a bilateral screening mammogram categorized BI-RADS 1. The patient reports approximately 3 weeks ago she noticed right axillary/lateral breast tenderness. She reports her symptoms have remained consistent, without improvement or worsening. Unable to identify aggravating or alleviating factors. She denies trauma or injury to the area. She denies any palpable breast masses bilaterally. Denies nipple discharge or bleeding or skin changes. Of note the patient also reports that she has stopped using OCPs due to personal preference. She understands literature suggest combined OCP use for chemoprevention in BRCA mutation patients, however patient wishes to remain off hormonal therapy at this time. She discloses she is interested in childbearing in the upcoming years and would like to be off hormonal treatments to be as \"natural\" as possible. We discussed barrier protection if not actively seeking pregnancy as well as use of barrier for STI prevention. Reports last menstrual period 1/3/2023 and lasts 4-5 days, moderate flow.        Past Medical History:   Diagnosis Date    BRCA1 negative     Breast lump in female 02/09/2020    Lt Axilla    FH: BRCA1 gene positive 09/2020    father carrier       Past Surgical History:   Procedure Laterality Date    BREAST BIOPSY Left 03/19/2020    Left Axilla biopsy:benign WISDOM TOOTH EXTRACTION         Family History   Problem Relation Age of Onset    No Known Problems Mother     Other Father         BRCA-1 gene positive    No Known Problems Maternal Grandmother     No Known Problems Maternal Grandfather     Breast Cancer Paternal Grandmother 36    Lung Cancer Paternal Grandmother     No Known Problems Paternal Grandfather     Breast Cancer Paternal Aunt 30        three episodes Br Ca       Review of Systems  I have personally reviewed and agree with the review of the systems done by my ancillary staff in the EPIC documentation. No results found. Lab Results   Component Value Date/Time     20 06/30/2022 09:28 AM     19 04/04/2022 12:09 PM       Objective:  Vitals:    01/10/23 1355   BP: 118/74   Site: Left Upper Arm   Position: Sitting   Cuff Size: Medium Adult   Pulse: 76   SpO2: 100%   Weight: 148 lb (67.1 kg)   Height: 5' 10\" (1.778 m)       Physical Exam  Exam conducted with a chaperone present (Ade PEREZ MA). Chest:   Breasts:     Right: Tenderness present. No swelling, mass, nipple discharge or skin change. Left: No swelling, bleeding, inverted nipple, mass, nipple discharge, skin change or tenderness. Comments: Tenderness on examination, no palpable nodules or masses. Lymphadenopathy:      Upper Body:      Right upper body: No axillary adenopathy. Left upper body: No axillary adenopathy. Assessment:  1.  BRCA gene positive      Plan:  Reviewed role of obtaining breast imaging, plan for bilateral diagnostic mammogram as patient is nearing due for bilaterally imaging at this time and given current physical concerns, additional R breast ultrasound ordered    Follow Up:  Based on results    Remain on schedule for July 2023 with pelvic ultrasound and  prior    I spent 25 minutes providing care to the patient, counseling and coordinating care, discussing the patient's current situation, reviewing her options, counseling and education her and answering her questions. All of the patient's pertinent images, labs and previous records and procedures were reviewed.     Electronically signed by Pee Nolasco PA-C on 1/10/2023 at 2:15 PM EST

## 2023-02-03 ENCOUNTER — HOSPITAL ENCOUNTER (OUTPATIENT)
Dept: ULTRASOUND IMAGING | Age: 23
End: 2023-02-03
Payer: COMMERCIAL

## 2023-02-03 ENCOUNTER — HOSPITAL ENCOUNTER (OUTPATIENT)
Dept: MAMMOGRAPHY | Age: 23
End: 2023-02-03
Payer: COMMERCIAL

## 2023-02-03 DIAGNOSIS — Z15.09 BRCA GENE POSITIVE: ICD-10-CM

## 2023-02-03 DIAGNOSIS — Z15.01 BRCA GENE POSITIVE: ICD-10-CM

## 2023-02-03 PROCEDURE — 76642 ULTRASOUND BREAST LIMITED: CPT

## 2023-05-02 ENCOUNTER — TELEPHONE (OUTPATIENT)
Dept: GYNECOLOGIC ONCOLOGY | Age: 23
End: 2023-05-02

## 2023-05-02 DIAGNOSIS — Z15.09 BRCA GENE POSITIVE: Primary | ICD-10-CM

## 2023-05-02 DIAGNOSIS — Z15.01 BRCA GENE POSITIVE: Primary | ICD-10-CM

## 2023-05-02 NOTE — TELEPHONE ENCOUNTER
Chart has been reviewed    Patient is with a BRCA1 gene mutation. Normal breast ultrasound noted (02/03/23)    Breast screening will need to be completed with annual breast exam, annual mammogram and annual breast MRI. Imaging to be staggered with the breast exam.    An order has been placed for screening breast mammogram, which can be completed at anytime. I see that she is scheduled to see me in July 2023. Please encourage the patient to keep this appointment.     Baljinder Galan MD  Gynecologic Oncology

## 2023-05-30 ENCOUNTER — HOSPITAL ENCOUNTER (OUTPATIENT)
Dept: MAMMOGRAPHY | Age: 23
Discharge: HOME | End: 2023-06-01
Payer: COMMERCIAL

## 2023-05-30 VITALS — BODY MASS INDEX: 21.47 KG/M2 | WEIGHT: 150 LBS | HEIGHT: 70 IN

## 2023-05-30 DIAGNOSIS — Z15.09 BRCA GENE POSITIVE: ICD-10-CM

## 2023-05-30 DIAGNOSIS — Z15.01 BRCA GENE POSITIVE: ICD-10-CM

## 2023-05-30 PROCEDURE — 77063 BREAST TOMOSYNTHESIS BI: CPT

## 2023-06-01 NOTE — RESULT ENCOUNTER NOTE
Please notify patient that her mammogram is negative for evidence for abnormal findings. Follow-up as planned.

## 2023-07-20 ENCOUNTER — HOSPITAL ENCOUNTER (OUTPATIENT)
Dept: ULTRASOUND IMAGING | Age: 23
Discharge: HOME | End: 2023-07-22
Attending: OBSTETRICS & GYNECOLOGY
Payer: COMMERCIAL

## 2023-07-20 ENCOUNTER — HOSPITAL ENCOUNTER (OUTPATIENT)
Age: 23
Discharge: HOME | End: 2023-07-20
Payer: COMMERCIAL

## 2023-07-20 DIAGNOSIS — Z15.01 BRCA GENE POSITIVE: ICD-10-CM

## 2023-07-20 DIAGNOSIS — Z15.09 BRCA GENE POSITIVE: ICD-10-CM

## 2023-07-20 PROCEDURE — 86304 IMMUNOASSAY TUMOR CA 125: CPT

## 2023-07-20 PROCEDURE — 76830 TRANSVAGINAL US NON-OB: CPT

## 2023-07-20 PROCEDURE — 76856 US EXAM PELVIC COMPLETE: CPT

## 2023-07-20 PROCEDURE — 36415 COLL VENOUS BLD VENIPUNCTURE: CPT

## 2023-07-21 LAB — CANCER AG125 SERPL-ACNC: 27 U/ML

## 2023-07-27 ENCOUNTER — TELEPHONE (OUTPATIENT)
Dept: GYNECOLOGIC ONCOLOGY | Age: 23
End: 2023-07-27

## 2023-07-27 NOTE — TELEPHONE ENCOUNTER
Left message to see if appt on 7/28/23 could be changed 10:00 am to ERICKSON Chaudhari due to change in schedule.

## 2023-07-28 ENCOUNTER — OFFICE VISIT (OUTPATIENT)
Dept: GYNECOLOGIC ONCOLOGY | Age: 23
End: 2023-07-28

## 2023-07-28 VITALS
SYSTOLIC BLOOD PRESSURE: 105 MMHG | BODY MASS INDEX: 22.03 KG/M2 | TEMPERATURE: 97.9 F | HEIGHT: 70 IN | DIASTOLIC BLOOD PRESSURE: 68 MMHG | OXYGEN SATURATION: 100 % | WEIGHT: 153.9 LBS | HEART RATE: 74 BPM

## 2023-07-28 DIAGNOSIS — Z15.09 BRCA GENE POSITIVE: Primary | ICD-10-CM

## 2023-07-28 DIAGNOSIS — Z15.01 BRCA GENE POSITIVE: Primary | ICD-10-CM

## 2023-07-28 ASSESSMENT — ENCOUNTER SYMPTOMS
GASTROINTESTINAL NEGATIVE: 1
BACK PAIN: 1
EYES NEGATIVE: 1
RESPIRATORY NEGATIVE: 1

## 2023-07-28 NOTE — PROGRESS NOTES
[address] [facility], [facility], [address]  [address] [facility] [address]    Katarzyna Valle is a 20 y.o. female who presents for a follow up for BRCA 1 mutation. Chief Complaint:  BRCA 1 mutation    HPI: The patient is a pleasant G18-year-old premenopausal female who was found to have BRCA1 gene mutation based on [facility] single site testing in January 2021. The patient has been counseled on risk reducing measures for her genetic mutation in regards to the breast and ovarian cancer association. She was previously on OCPs for dysmenorrhea and also chemoprevention however she has stopped the OCP medication over the past 6 months due to personal choices. She remains interested in fertility sparing surveillance. She understands the limitations regarding colon cancer screening. However she has been on serial examinations of pelvic ultrasounds and 's yearly counseled to have these imaging studies performed until age 25 then increase screening to every 6 months until she has completed childbearing desires and recommend a risk reducing bilateral salpingo-oophorectomy. She also desires to remain conservative for breast cancer screening as well. She has been in referred to the previous high-risk breast clinic. She has not been seen yet by the medical oncologist or breast surgery. Last Pap smear December 2021, negative for intraepithelial lesion or malignancy. She denies a history of abnormal Pap smears. Due for cervical cytology screening in December 2024. Most recent mammogram May 2023 was negative, BI-RADS 1. She is planned for alternating MRI breast and mammogram breast yearly with yearly breast examinations. Most recent ultrasound pelvis in July 2023 was unremarkable. Uterus measures 6.3 x 3.5 x 3.4 cm. Endometrial stripe within normal limits for premenstrual female. Bilateral ovaries within normal limits. No evidence of free fluid.   Her  remains within

## 2023-07-28 NOTE — PROGRESS NOTES
Review of Systems   Constitutional: Negative. HENT:  Negative. Eyes: Negative. Respiratory: Negative. Cardiovascular: Negative. Gastrointestinal: Negative. Endocrine: Negative. Genitourinary: Negative. Musculoskeletal:  Positive for back pain and neck pain. Neurological: Negative.

## 2023-08-14 ENCOUNTER — OFFICE VISIT (OUTPATIENT)
Dept: SURGERY | Age: 23
End: 2023-08-14
Payer: COMMERCIAL

## 2023-08-14 VITALS
OXYGEN SATURATION: 100 % | HEART RATE: 73 BPM | SYSTOLIC BLOOD PRESSURE: 119 MMHG | WEIGHT: 151.4 LBS | BODY MASS INDEX: 21.67 KG/M2 | DIASTOLIC BLOOD PRESSURE: 73 MMHG | HEIGHT: 70 IN

## 2023-08-14 DIAGNOSIS — Z15.01 BRCA GENE POSITIVE: Primary | ICD-10-CM

## 2023-08-14 DIAGNOSIS — Z80.3 FAMILY HISTORY OF SECONDARY BREAST CANCER: ICD-10-CM

## 2023-08-14 DIAGNOSIS — Z15.09 BRCA GENE POSITIVE: Primary | ICD-10-CM

## 2023-08-14 PROCEDURE — 99202 OFFICE O/P NEW SF 15 MIN: CPT | Performed by: SURGERY

## 2023-08-14 PROCEDURE — G8420 CALC BMI NORM PARAMETERS: HCPCS | Performed by: SURGERY

## 2023-08-14 PROCEDURE — G8427 DOCREV CUR MEDS BY ELIG CLIN: HCPCS | Performed by: SURGERY

## 2023-08-14 PROCEDURE — 1036F TOBACCO NON-USER: CPT | Performed by: SURGERY

## 2023-08-14 NOTE — PROGRESS NOTES
Patient's Name/Date of Birth: Manny Singh / 2000 (83 y.o.)    Date: August 14, 2023     HPI: Pt is a 20 y.o. female who presents to [address] [place] [address] for high risk evaluation. The patient is BRCA1 positive and is here to initiate high risk breast follow-up and discuss surgical risk reduction. She currently has no breast issues but has had a biopsy in the past on the left breast axilla in March 2020 which was negative on final pathology. Past Medical History:   Diagnosis Date    BRCA1 gene mutation positive     Breast lump in female 02/09/2020    Lt Axilla    FH: BRCA1 gene positive 09/2020    father carrier       Past Surgical History:   Procedure Laterality Date    BREAST BIOPSY Left 03/19/2020    Left Axilla biopsy:benign    WISDOM TOOTH EXTRACTION         Current Outpatient Medications   Medication Sig Dispense Refill    Clindamycin-Benzoyl Per, Refr, 1.2-5 % GEL        No current facility-administered medications for this visit.        Allergies   Allergen Reactions    Doxycycline      Rash and hives        Family History   Problem Relation Age of Onset    No Known Problems Mother     Other Father         BRCA-1 gene positive    No Known Problems Maternal Grandmother     No Known Problems Maternal Grandfather     Breast Cancer Paternal Grandmother 36    Lung Cancer Paternal Grandmother     No Known Problems Paternal Grandfather     Breast Cancer Paternal Aunt 27        three episodes Br Ca       Social History     Socioeconomic History    Marital status: Single     Spouse name: Not on file    Number of children: Not on file    Years of education: Not on file    Highest education level: Not on file   Occupational History    Not on file   Tobacco Use    Smoking status: Never    Smokeless tobacco: Never   Vaping Use    Vaping Use: Never used   Substance and Sexual Activity    Alcohol use: Yes     Comment: weekly    Drug use: Never    Sexual activity: Yes     Partners: Male     Birth